# Patient Record
Sex: FEMALE
[De-identification: names, ages, dates, MRNs, and addresses within clinical notes are randomized per-mention and may not be internally consistent; named-entity substitution may affect disease eponyms.]

---

## 2018-01-09 ENCOUNTER — HOSPITAL ENCOUNTER (INPATIENT)
Dept: HOSPITAL 14 - H.L&D | Age: 43
LOS: 4 days | Discharge: HOME | DRG: 371 | End: 2018-01-13
Attending: OBSTETRICS & GYNECOLOGY | Admitting: OBSTETRICS & GYNECOLOGY
Payer: COMMERCIAL

## 2018-01-09 VITALS — BODY MASS INDEX: 32.1 KG/M2

## 2018-01-09 DIAGNOSIS — N85.8: ICD-10-CM

## 2018-01-09 DIAGNOSIS — Z30.2: ICD-10-CM

## 2018-01-09 DIAGNOSIS — O34.219: Primary | ICD-10-CM

## 2018-01-09 DIAGNOSIS — O34.83: ICD-10-CM

## 2018-01-09 DIAGNOSIS — Z87.891: ICD-10-CM

## 2018-01-09 DIAGNOSIS — Z3A.39: ICD-10-CM

## 2018-01-09 PROCEDURE — 4A1HXCZ MONITORING OF PRODUCTS OF CONCEPTION, CARDIAC RATE, EXTERNAL APPROACH: ICD-10-PCS | Performed by: OBSTETRICS & GYNECOLOGY

## 2018-01-10 LAB
BASOPHILS # BLD AUTO: 0.1 K/UL (ref 0–0.2)
BASOPHILS NFR BLD: 1.4 % (ref 0–2)
EOSINOPHIL # BLD AUTO: 0.4 K/UL (ref 0–0.7)
EOSINOPHIL NFR BLD: 4.3 % (ref 0–4)
ERYTHROCYTE [DISTWIDTH] IN BLOOD BY AUTOMATED COUNT: 15.8 % (ref 11.5–14.5)
HGB BLD-MCNC: 9.9 G/DL (ref 12–16)
LYMPHOCYTES # BLD AUTO: 1.6 K/UL (ref 1–4.3)
LYMPHOCYTES NFR BLD AUTO: 16.2 % (ref 20–40)
MCH RBC QN AUTO: 28.7 PG (ref 27–31)
MCHC RBC AUTO-ENTMCNC: 32.2 G/DL (ref 33–37)
MCV RBC AUTO: 89.2 FL (ref 81–99)
MONOCYTES # BLD: 0.6 K/UL (ref 0–0.8)
MONOCYTES NFR BLD: 6.7 % (ref 0–10)
NEUTROPHILS # BLD: 6.9 K/UL (ref 1.8–7)
NEUTROPHILS NFR BLD AUTO: 71.4 % (ref 50–75)
NRBC BLD AUTO-RTO: 0.2 % (ref 0–0)
PLATELET # BLD: 250 K/UL (ref 130–400)
PMV BLD AUTO: 9.2 FL (ref 7.2–11.7)
RBC # BLD AUTO: 3.45 MIL/UL (ref 3.8–5.2)
WBC # BLD AUTO: 9.6 K/UL (ref 4.8–10.8)

## 2018-01-10 PROCEDURE — 0UB70ZZ EXCISION OF BILATERAL FALLOPIAN TUBES, OPEN APPROACH: ICD-10-PCS | Performed by: OBSTETRICS & GYNECOLOGY

## 2018-01-10 NOTE — OBDS
===================================

DELIVERY PERSONNEL

===================================

   

Delivery Doctor:  SAMUEL Cope DO

Scrub Nurse:  Cherise Oliveira OBT

Circulator:  Cait Lan RN

Anesthesiologist:  NOAH Serrano MD

   

===================================

MATERNAL INFORMATION

===================================

   

Delivery Anesthesia:  Spinal

Medications in Delivery:  2 gm Ancef, 30 units of pitocin, 20 units of pitocin

Estimated  Blood Loss (ml):  800

Placenta Cultured:  No

Maternal Complications:  None

Provider Comments:  PreOp Dx: IUP 39w; previous C/S x 2 (declined ); multiparity/desired voluntar
y sterilization

   PostOp Dx Same

   Procedure: repeat (GENTLE)C/S via previous scar/BTL via modified [place] technique

      

   [place] Dr Cope

   Asst Dr Carrera

   Anesth: Dr Ramírez/Dr Prado

   Rossi: spinal

      

   Findings: live male infant delivery delivreed from [facility] pres

   clear AF

   Placenta delivered intact manually

   Ovaries and tubes WNL

   Specimen: segments of fallopian tube

      

   She remained stable

   EBL 800cc

   

===================================

LABOR SUMMARY

===================================

   

EDC:  2018 00:00

No. Babies in Womb:  1

 Attempted:  No

Labor Anesthesia:  Spinal

   

===================================

LABOR INFORMATION

===================================

   

Reason for Induction:  Not Applicable

Oxytocin:  N/A

Group B Beta Strep:  Positive

Antibiotics # of Doses:  N/A

Antibiotics Time of Last Dose:  N/A

Steroids Given:  None

Reason Steroids Not Administered:  Not Applicable

   

===================================

MEMBRANES

===================================

   

Membranes Rupture Method:  Artificial

Rupture of Membranes:  01/10/2018 06:46

Length of Rupture (hrs):  0.02

Amniotic Fluid Color:  Clear

Amniotic Fluid Amount:  Small

Amniotic Fluid Odor:  Normal

   

===================================

STAGES OF LABOR

===================================

   

Stage 3 hrs:  0

Stage 3 min:  1

   

===================================

CSECTION DELIVERY

===================================

   

Primary Indication:  > 2 Previous CSections

Other Primary Indication:  decelined 

Secondary Indication:  Repeat Elective

CSection Urgency:  Elective

CSection Incidence:  Repeat

Labor:  No Labor

Elective:  Elective

CSection Incision:  Lower Uterine Transverse

Other Sterilization Procedure:  Modified Karen

Uterine Closure:  Double-layer closure

   

===================================

BABY A INFORMATION

===================================

   

Infant Delivery Date/Time:  01/10/2018 06:47

Method of Delivery:  

Born in Route :  No

:  N/A

Forceps:  N/A

Vacuum Extraction:  N/A

Shoulder Dystocia :  No

   

===================================

SHOULDER DYSTOCIA BABY A

===================================

   

Infant Delivery Date/Time:  01/10/2018 06:47

   

===================================

PRESENTATION/POSITION BABY A

===================================

   

Presentation:  Cephalic

Cephalic Presentation:  Vertex

Breech Presentation:  N/A

   

===================================

PLACENTA INFORMATION BABY A

===================================

   

Placenta Delivery Time :  01/10/2018 06:48

Placenta Method of Delivery:  Manual Removal

Placenta Status:  Delivered

   

===================================

APGAR SCORES BABY A

===================================

   

Heart Rate 1 min:  >100 bpm

Resp Effort 1 min:  Good Cry

Reflex Irritability 1 min:  Cough or Sneeze or Pulls Away

Muscle Tone 1 min:  Active Motion

Color 1 min:  Body Pink, Extremities Blue

Resuscitation Effort 1 min:  Tactile Stimulation

APGAR SCORE 1 MIN:  9

Heart Rate 5 min:  >100 bpm

Resp Effort 5 min:  Good Cry

Reflex Irritability 5 min:  Cough or Sneeze or Pulls Away

Muscle Tone 5 min:  Active Motion

Color 5 min:  Body Pink, Extremities Blue

Resuscitation Effort 5 min:  N/A

APGAR SCORE 5 MIN:  9

   

===================================

INFANT INFORMATION BABY A

===================================

   

Gestational Age at Delivery:  39.0

Gestational Status:  Term

Infant Outcome :  Liveborn

Infant Condition :  Stable

Infant Sex:  Male

   

===================================

IDENTIFICATION/MEDS BABY A

===================================

   

ID Band Number:  [id number]

ID Band Location:  Left Leg; Left Arm



   

===================================

WEIGHT/LENGTH BABY A

===================================

   

Infant Birthweight (gms):  3070

Infant Weight (lb):  6

Infant Weight (oz):  12

   

===================================

CORD INFORMATION BABY A

===================================

   

No. Cord Vessels:  3

Nuchal Cord :  N/A

Nuchal Cord Other:  N/A

True Knot:  N/A

Infant Cord pH Baby Arterial:  N/A

Infant Cord pH Baby Venous:  N/A

Cord Blood Taken:  Yes

Banking/Donate Info:  N/A

Infant Suction:  Mouth; Nose

   

===================================

ASSESSMENT BABY A

===================================

   

Infant Complications:  Other

Infant Complications Other:  nasal flaring

Physical Findings at Delivery:  Within Normal Limits

Infant Respirations:  Nasal Flaring

Neonatologist/ALS Called :  No

Infant Care By:  Dr. Samanta Warren

Transferred To:   Nursery

## 2018-01-10 NOTE — OBADHP
===========================

Datetime: 2018 23:45

===========================

   

Admit Comment, IP Provider:  41 yo  ega 39 wks presents for scheduled repeat c/s. 

   +: fm

   Denies: VB, LOF, CTX, CP/SOB/N/V

   PNC: Dr. Cope

   obhx: c/s x2 m and F at 40 wks; no complications

   gyne: denies hx of sti; pap negative.

   medhx: none

   famhx: htn

   surg: c/s x2; cholecytectomy; l ovarian cyst

   soc: hx of smoking 7 pack year before current pregnancy; denies: alcohol or illicit drugs

   rx: pnv

   NKDA

   General: pleasant, in no acute distress 

   HEENT: normocephalic, PERRLA; AAOx3  

   Heart: no murmurs, regular rate and rhythm, S1, S2 normal.  

   Lungs: clear to auscultation bilaterally, no wheezing  

   Abdomen: nontender, gravid  

   CVA: negative 

   Lower extremities: negative for pitting edema   

      

   GBS: pos ; ABO-Rh:  O+; Ab: neg ; HIV: neg; RPR: neg; GC/C: neg; Rubella: IM; HbsAg: neg; TDap:  1
2017

      

   41 yo  IUP 38.6 wks presents for scheduled repeat c/s.

   -admit L_D

   -labs: cbc, ts, lr, ancef 2gm, 

   case dw OB attending

   Louis Rolon MD PGY1

      

   OB Attending note:

   Pt seen and examined by me.  prenatal chart rev'd...she declines  and wants voluntary steriliz
ation - informed consent obtained.

Pelvic Type - PN:  Not Done

Extremities - PN:  Normal

Abdomen - PN:  Normal

Back - PN:  Normal

Breast - PN:  Not Done

Lungs - PN:  Normal

Heart - PN:  Normal

Thyroid - PN:  Not Done

Neurologic - PN:  Normal

HEENT - PN:  Normal

General - PN:  Normal

FHR - Baseline A Provider:  120

IP Prenatal Hx Assessment:  The Prenatal History has been Reviewed and is Current

Vital Signs Provider:  Reviewed; Within Normal Limits

IP Chief Complaint:  Scheduled  Section

NICHD Variability Prov Fetus A:  Moderate 6-25bpm

NICHD Accel Fetus A IP Provider:  [id number]

FHR Category Provider Fetus A:  Category I

NICHD Decel Fetus A IP Provider:  None

Genitourinary Exam:  Normal

DTRs - PN:  Not Done

IP Adm Impression:  Term, intrauterine pregnancy; No Active Labor; Intact Membranes

IP Admit Plan:  Admit to unit; Initiate  Section protocol

## 2018-01-10 NOTE — OBHP
===========================

Datetime: 2018 23:45

===========================

   

IP Adm Impression:  Term, intrauterine pregnancy; No Active Labor; Intact Membranes

IP Admit Plan:  Admit to unit; Initiate  Section protocol

Admit Comment, IP Provider:  43 yo  ega 39 wks presents for scheduled repeat c/s. 

   +: fm

   Denies: VB, LOF, CTX, CP/SOB/N/V

   PNC: Dr. Cope

   obhx: c/s x2 m and F at 40 wks; no complications

   gyne: denies hx of sti; pap negative.

   medhx: none

   famhx: htn

   surg: c/s x2; cholecytectomy; l ovarian cyst

   soc: hx of smoking 7 pack year before current pregnancy; denies: alcohol or illicit drugs

   rx: pnv

   NKDA

   General: pleasant, in no acute distress 

   HEENT: normocephalic, PERRLA; AAOx3  

   Heart: no murmurs, regular rate and rhythm, S1, S2 normal.  

   Lungs: clear to auscultation bilaterally, no wheezing  

   Abdomen: nontender, gravid  

   CVA: negative 

   Lower extremities: negative for pitting edema   

      

   GBS: pos ; ABO-Rh:  O+; Ab: neg ; HIV: neg; RPR: neg; GC/C: neg; Rubella: IM; HbsAg: neg; TDap:  1
2017

      

   43 yo  IUP 38.6 wks presents for scheduled repeat c/s.

   -admit L_D

   -labs: cbc, ts, lr, ancef 2gm, 

   case dw OB attending

   Louis Rolon MD PGY1

      

   OB Attending note:

   Pt seen and examined by me.  prenatal chart rev'd...she declines  and wants voluntary steriliz
ation - informed consent obtained.

Pelvic Type - PN:  Not Done

Extremities - PN:  Normal

Abdomen - PN:  Normal

Back - PN:  Normal

Breast - PN:  Not Done

Lungs - PN:  Normal

Heart - PN:  Normal

Thyroid - PN:  Not Done

Neurologic - PN:  Normal

HEENT - PN:  Normal

General - PN:  Normal

FHR - Baseline A Provider:  120

IP Prenatal Hx Assessment:  The Prenatal History has been Reviewed and is Current

Vital Signs Provider:  Reviewed; Within Normal Limits

IP Indication for Induction:  Other

IP Indication for Induction Oth:  repeat c/s

IP Chief Complaint:  Scheduled  Section

NICHD Variability Prov Fetus A:  Moderate 6-25bpm

NICHD Accel Fetus A IP Provider:  [id number]

FHR Category Provider Fetus A:  Category I

NICHD Decel Fetus A IP Provider:  None

Genitourinary Exam:  Normal

DTRs - PN:  Not Done

## 2018-01-10 NOTE — OBDS
===================================

DELIVERY PERSONNEL

===================================

   

Delivery Doctor:  SAMUEL Cope DO

Scrub Nurse:  Cherise Oliveira OBT

Circulator:  Cait Lan RN

Anesthesiologist:  NOAH Serrano MD

   

===================================

MATERNAL INFORMATION

===================================

   

Delivery Anesthesia:  Spinal

Medications in Delivery:  2 gm Ancef, 30 units of pitocin, 20 units of pitocin

Estimated  Blood Loss (ml):  800

Placenta Cultured:  No

Maternal Complications:  None

Provider Comments:  PreOp Dx: IUP 39w; previous C/S x 2 (declined ); multiparity/desired voluntar
y sterilization

   PostOp Dx Same

   Procedure: repeat (GENTLE)C/S via previous scar/BTL via modified [place] technique

      

   [place] Dr Cope

   Asst Dr Carrera

   Anesth: Dr Ramírez/Dr Prado

   Rossi: spinal

      

   Findings: live male infant delivery delivreed from [facility] pres

   clear AF

   Placenta delivered intact manually

   Ovaries and tubes WNL

   Specimen: segments of fallopian tube

      

   She remained stable

   EBL 800cc

   

===================================

LABOR SUMMARY

===================================

   

EDC:  2018 00:00

No. Babies in Womb:  1

 Attempted:  No

Labor Anesthesia:  Spinal

   

===================================

LABOR INFORMATION

===================================

   

Reason for Induction:  Not Applicable

Oxytocin:  N/A

Group B Beta Strep:  Positive

Antibiotics # of Doses:  N/A

Antibiotics Time of Last Dose:  N/A

Steroids Given:  None

Reason Steroids Not Administered:  Not Applicable

   

===================================

MEMBRANES

===================================

   

Membranes Rupture Method:  Artificial

Rupture of Membranes:  01/10/2018 06:46

Length of Rupture (hrs):  0.02

Amniotic Fluid Color:  Clear

Amniotic Fluid Amount:  Small

Amniotic Fluid Odor:  Normal

   

===================================

STAGES OF LABOR

===================================

   

Stage 3 hrs:  0

Stage 3 min:  1

   

===================================

CSECTION DELIVERY

===================================

   

Primary Indication:  > 2 Previous CSections

Other Primary Indication:  decelined 

Secondary Indication:  Repeat Elective

CSection Urgency:  Elective

CSection Incidence:  Repeat

Labor:  No Labor

Elective:  Elective

CSection Incision:  Lower Uterine Transverse

Other Sterilization Procedure:  Modified Karen

Uterine Closure:  Double-layer closure

   

===================================

BABY A INFORMATION

===================================

   

Infant Delivery Date/Time:  01/10/2018 06:47

Method of Delivery:  

Born in Route :  No

:  N/A

Forceps:  N/A

Vacuum Extraction:  N/A

Shoulder Dystocia :  No

   

===================================

SHOULDER DYSTOCIA BABY A

===================================

   

Infant Delivery Date/Time:  01/10/2018 06:47

   

===================================

PRESENTATION/POSITION BABY A

===================================

   

Presentation:  Cephalic

Cephalic Presentation:  Vertex

Breech Presentation:  N/A

   

===================================

PLACENTA INFORMATION BABY A

===================================

   

Placenta Delivery Time :  01/10/2018 06:48

Placenta Method of Delivery:  Manual Removal

Placenta Status:  Delivered

   

===================================

APGAR SCORES BABY A

===================================

   

Heart Rate 1 min:  >100 bpm

Resp Effort 1 min:  Good Cry

Reflex Irritability 1 min:  Cough or Sneeze or Pulls Away

Muscle Tone 1 min:  Active Motion

Color 1 min:  Body Pink, Extremities Blue

Resuscitation Effort 1 min:  Tactile Stimulation

APGAR SCORE 1 MIN:  9

Heart Rate 5 min:  >100 bpm

Resp Effort 5 min:  Good Cry

Reflex Irritability 5 min:  Cough or Sneeze or Pulls Away

Muscle Tone 5 min:  Active Motion

Color 5 min:  Body Pink, Extremities Blue

Resuscitation Effort 5 min:  N/A

APGAR SCORE 5 MIN:  9

   

===================================

INFANT INFORMATION BABY A

===================================

   

Gestational Age at Delivery:  39.0

Gestational Status:  Term

Infant Outcome :  Liveborn

Infant Condition :  Stable

Infant Sex:  Male

   

===================================

IDENTIFICATION/MEDS BABY A

===================================

   

ID Band Number:  [id number]

ID Band Location:  Left Leg; Left Arm



   

===================================

WEIGHT/LENGTH BABY A

===================================

   

Infant Birthweight (gms):  3070

Infant Weight (lb):  6

Infant Weight (oz):  12

   

===================================

CORD INFORMATION BABY A

===================================

   

No. Cord Vessels:  3

Nuchal Cord :  N/A

Nuchal Cord Other:  N/A

True Knot:  N/A

Infant Cord pH Baby Arterial:  N/A

Infant Cord pH Baby Venous:  N/A

Cord Blood Taken:  Yes

Banking/Donate Info:  N/A

Infant Suction:  Mouth; Nose

   

===================================

ASSESSMENT BABY A

===================================

   

Infant Complications:  Other

Infant Complications Other:  nasal flaring

Physical Findings at Delivery:  Within Normal Limits

Infant Respirations:  Nasal Flaring

Neonatologist/ALS Called :  No

Infant Care By:  Dr. Samanta Warren

Transferred To:   Nursery

## 2018-01-11 LAB
BASOPHILS # BLD AUTO: 0 K/UL (ref 0–0.2)
BASOPHILS NFR BLD: 0.2 % (ref 0–2)
EOSINOPHIL # BLD AUTO: 0.2 K/UL (ref 0–0.7)
EOSINOPHIL NFR BLD: 2.2 % (ref 0–4)
ERYTHROCYTE [DISTWIDTH] IN BLOOD BY AUTOMATED COUNT: 15.4 % (ref 11.5–14.5)
HGB BLD-MCNC: 9 G/DL (ref 12–16)
LYMPHOCYTES # BLD AUTO: 1 K/UL (ref 1–4.3)
LYMPHOCYTES NFR BLD AUTO: 10.9 % (ref 20–40)
MCH RBC QN AUTO: 28.3 PG (ref 27–31)
MCHC RBC AUTO-ENTMCNC: 31.5 G/DL (ref 33–37)
MCV RBC AUTO: 89.7 FL (ref 81–99)
MONOCYTES # BLD: 0.5 K/UL (ref 0–0.8)
MONOCYTES NFR BLD: 5.9 % (ref 0–10)
NEUTROPHILS # BLD: 7.2 K/UL (ref 1.8–7)
NEUTROPHILS NFR BLD AUTO: 80.8 % (ref 50–75)
NRBC BLD AUTO-RTO: 0 % (ref 0–0)
PLATELET # BLD: 194 K/UL (ref 130–400)
PMV BLD AUTO: 8.1 FL (ref 7.2–11.7)
RBC # BLD AUTO: 3.19 MIL/UL (ref 3.8–5.2)
WBC # BLD AUTO: 9 K/UL (ref 4.8–10.8)

## 2018-01-11 NOTE — OP
PROCEDURE DATE:  01/10/2018



PREOPERATIVE DIAGNOSES:

1.  Intrauterine pregnancy at 39 weeks' gestation.

2.  Previous  section x2.

3.  Declining vaginal birth after  section.

4.  Multiparity, desiring voluntary sterilization.



POSTOPERATIVE DIAGNOSES:

1.  Intrauterine pregnancy at 39 weeks' gestation.

2.  Previous  section x2.

3.  Declining vaginal birth after  section.

4.  Multiparity, desiring voluntary sterilization.



PROCEDURE:

1.  Repeat (gentle)  via previous surgical scar.

2.  Bilateral tubal ligation via modified Karen technique.



SURGEON:  Roman Cope DO.



ASSISTANT:  Jalil Carrera MD. (Dr. Jalil Carrera is a board-certified OB/GYN

physician, who happened to be in Labor and Delivery for the .  He

was present from time of incision through the delivery of the infant to

closure of the skin.  His presence was vital and necessary for the

procedure).



ANESTHESIOLOGISTS:  Marty Serrano MD and Amina Prado MD.



TYPE OF ANESTHESIA:  Spinal.



OPERATIVE FINDINGS:  Live male infant delivered from the cephalic

presentation.  Clear amniotic fluid noted.  Placenta was delivered intact

manually.  Ovaries and tubes appeared to be within normal limits grossly.



SPECIMEN:  Included segments of right and left fallopian tubes.



ESTIMATED BLOOD LOSS:  800 mL.



She remained hemodynamically stable throughout the procedure.  All

equipments, sponges, needles accounted for.



DESCRIPTION OF PROCEDURE:  Sherri was brought to the operating room.  After

successful spinal anesthesia by Dr. Serrano, she was placed in the supine

position.  Compression boots were placed on both lower extremities. 

Catheter was used to drain the bladder of its contents and left in place. 

She was draped and prepped in the usual sterile manner.  Once adequate

anesthesia was obtained, an incision was made through previous surgical

scar using a scalpel.  This incision was then taken down to underlying

fascia using electrocautery.  Fascia was nicked in the midline and extended

bilaterally using electrocautery.  Inferior aspect of the fascia was

grasped using two Kocher clamps, tented up, and the rectus muscle was both

bluntly and sharply dissected using electrocautery.  The same was done with

the superior aspect of the fascia.  Superiorly in the midline, we were able

to identify the peritoneum.  There was an opening within the rectus muscle.

Incision was then extended superiorly and inferiorly with direct

visualization of bladder and intestines using electrocautery and Metzenbaum

scissors.  A bladder blade was then inserted.  Bladder flap was created by

incising peritoneum and the uterus and then extended bilaterally using

Metzenbaum scissors.  Bladder flap was created digitally.  Bladder blade

was inserted behind the bladder flap.  A low transverse incision was made

using a scalpel.  Upon entering the uterus, the incision was extended

bilaterally using bandage scissors.  Clear amniotic fluid was noted upon

rupture of membranes using forceps with teeth.  First, the head was

delivered as atraumatically as possible, was bulb suctioned

nasopharyngeally.  Patient was able to visualize this through the gentle

 drapes.  The remainder of the infant was then delivered as

atraumatically as possible.  Cord was clamped and cut.  Infant was handed

to the pediatrician in attendance.  Placenta was delivered intact manually.

Uterus was then exteriorized, cleared of debris and clots. IV Pitocin

given.  Ovaries and tubes appeared to be within normal limits grossly. 

First layer of the uterus was closed using 0 Vicryl suture in an

interlocking fashion.  Second layer of the uterus was closed imbricating

the first layer.  Good hemostasis was assured.  Posterior cul-de-sac was

noted to be clear of debris and clots.



Attention was then drawn to left fallopian tube.  This was followed after

the fimbriated end.  A part of it was grasped using a Lafayette clamp and

using 2-0 Chromic sutures, this was doubly ligated and this segment of

fallopian tube was then excised.  Hemostasis was assured.  Same was done

with the right fallopian tube.  Uterus was then placed back into peritoneal

cavity.  Paracolic gutters were noted to be clean.  Irrigation was

performed.  Incision line was reinspected on the uterus and noted to have

hemostasis.  Both fallopian stumps were identified and noted to have

hemostasis.  All equipments removed and accounted for.  0 Vicryl suture was

used to approximate the fascial layer in a running fashion.  Rectus muscle

was noted to have good hemostasis.  0 Vicryl suture was used to approximate

the fascial layer in a running fashion.  Irrigation was performed. 

Hemostasis assured.  2-0 plain suture was then used to approximate

subcuticular layer x2.  3-0 Vicryl suture was then used to approximate the

skin.  Steri-Strips, Dermabond, and a pressure bandage were applied.  All

equipments, sponges, needles accounted for.  She tolerated the procedure

well, was transferred to the recovery room in stable condition.





__________________________________________

Roman Cope DO



DD:  01/10/2018 16:29:11

DT:  2018 0:05:24

Job # [id number]

## 2018-01-12 NOTE — OBPPN
===========================

Datetime: 01/12/2018 07:44

===========================

   

PP Pain Prov:  Within normal limits

PP Nausea Prov:  Denies

PP Flatus Prov:  Yes

PP BM Prov:  No

PP Abdomen/Uterus Prov:  Normal

PP Lochia Prov:  Normal

PP Extremities Prov:  Normal

PP Comments Phys Exam Prov:  Incision intact w/ steri strips 

PP Impression Prov:  Normal postpartum progression

PP Plan Prov:  Continue present management

PP Progress Note Prov:  POD 2 s/p Repeat c/s, BTL, doing well, bottle feeding 

   Continue current care 

Vital Signs Provider PP:  Reviewed; Within Normal Limits

## 2018-01-12 NOTE — NBPN
===========================

Datetime: 2018 10:18

===========================

   

Nsy Prov Gen Appearance:  Within Normal Limits

Nsy Prov Skin:  Within Normal Limits; Jaundice

Nsy Prov Neuro:  Normal Tone; [place]; Grasp; Root; Suck

Nsy Prov Musculoskeletal:  Within Normal Limits; Full Range of Motion; Spontaneous Movement All Extre
mities; Intact Clavicles; Clavicles without Crepitus; Gluteal Folds Symmetrical; Spine Within Normal 
Limits; No Sacral Dimple/Cyst

Nsy Prov Head:  Normal Fontanelles; Normocephalic; Sutures WNL

Nsy Prov EENT:  Mouth Within Normal Limits; Ears Within Normal Limits; Eyes Within Normal Limits; Eye
s Red Reflex Bilaterally; Nose Within Normal Limits; Face Within Normal Limits

Nsy Prov Cardiovascular:  Within Normal Limits; Normal Pulses

Nsy Prov Respiratory:  Within Normal Limits

Nsy Prov GI:  Within Normal Limits; Soft; Normal Liver; Non Palpable Spleen; Patent Anus

Nsy Prov Umbilicus:  Within Normal Limits; Three Vessel Cord

Nsy Prov :  Normal Male Genitalia

Nsy Prov Skin Details:  jaundice

Nsy Prov PE Comments:  Pt. examined w/ mother @ bedside.

   s/p Circ, yest.

Nsy Prov Impression:  Healthy Term ; Vital Signs Appropriate; Bonding Appropriately; Voiding a
nd Stooling

Nsy Prov Plan:  Continue Umpire Care; Lactation Consult

Nsy Prov Impression/Plan Details:  Dx: 2 days old, FT,  AGA Male/Rpt C/S/s/p Circ./jaundice w/ Bili=1
0.3 @ 49 HRS old

   PLANS:  Continue Routine NN Care.

        Plans discussed w/ mother @ bedside.

      

Nsy Prov Laboratory:   Bili @ 6PM.

## 2018-01-13 VITALS
RESPIRATION RATE: 18 BRPM | HEART RATE: 76 BPM | SYSTOLIC BLOOD PRESSURE: 107 MMHG | TEMPERATURE: 97.5 F | OXYGEN SATURATION: 99 % | DIASTOLIC BLOOD PRESSURE: 67 MMHG

## 2018-01-13 NOTE — OBPPN
===========================

Datetime: 01/13/2018 12:58

===========================

   

PP Pain Prov:  Within normal limits

PP Nausea Prov:  Denies

PP Flatus Prov:  Yes

PP BM Prov:  Yes

PP Breasts Prov:  Normal

PP Heart Prov:  Normal

PP Lungs Prov:  Normal

PP Abdomen/Uterus Prov:  Normal

PP Lochia Prov:  Normal

PP Extremities Prov:  Normal

PP C/S Incision Prov:  Normal

PP Impression Prov:  Normal postpartum progression

PP Plan Prov:  Discharge

PP Progress Note Prov:  s: no c/o. ambulating 

   i: pod3 cd

   p: d/c home

   rout pp and postop instructions

   rx percocet#15, motrin 600mg#39, senokot s and Feso4.

   f/u 10days w/ dr lennon

IP PP Procedures:  None

## 2018-01-13 NOTE — OBDCSUM
===========================

Datetime: 01/13/2018 13:01

===========================

   

Discharged to, Provider:  Home

Follow up at, Provider:  saji Smith Instr Activity:  May Shower

Disch Instr Diet:  Regular

Discharge Instructions, Provider:  Specific instructions as noted

Discharge Diagnosis, Provider:  Term Pregnancy Delivered

Follow up in weeks, Provider:  10days

Disch Activity Restrictions:  No exercising; No driving; No sexual activity; Nothing in vagina - Inte
rcourse, tampons, douche

Discharge Comment, Provider:  p: d/c home

   rout pp and postop instructions

   rx percocet#15, motrin 600mg#39, senokot s and Feso4.

   f/u 10days w/ dr lennon

Contraception after Delivery:  Tubal Ligation

## 2019-04-19 ENCOUNTER — HOSPITAL ENCOUNTER (OUTPATIENT)
Dept: HOSPITAL 14 - H.ENDO | Age: 44
Discharge: HOME | End: 2019-04-19
Attending: INTERNAL MEDICINE
Payer: COMMERCIAL

## 2019-04-19 VITALS — HEART RATE: 71 BPM | SYSTOLIC BLOOD PRESSURE: 107 MMHG | DIASTOLIC BLOOD PRESSURE: 70 MMHG

## 2019-04-19 VITALS — RESPIRATION RATE: 12 BRPM | TEMPERATURE: 97 F | OXYGEN SATURATION: 99 %

## 2019-04-19 VITALS — BODY MASS INDEX: 25.1 KG/M2

## 2019-04-19 DIAGNOSIS — R12: ICD-10-CM

## 2019-04-19 DIAGNOSIS — K31.89: Primary | ICD-10-CM

## 2019-04-19 DIAGNOSIS — J45.909: ICD-10-CM

## 2019-04-19 PROCEDURE — 88305 TISSUE EXAM BY PATHOLOGIST: CPT

## 2019-04-19 PROCEDURE — 43239 EGD BIOPSY SINGLE/MULTIPLE: CPT
